# Patient Record
Sex: FEMALE | Race: BLACK OR AFRICAN AMERICAN | Employment: PART TIME | URBAN - METROPOLITAN AREA
[De-identification: names, ages, dates, MRNs, and addresses within clinical notes are randomized per-mention and may not be internally consistent; named-entity substitution may affect disease eponyms.]

---
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
## 2020-12-31 NOTE — PLAN OF CARE
Verbalizes/displays adequate comfort level or patient's stated pain goal Progressing    Pt receiving scheduled pain meds, rates pain 3-5/10. Assumed care of patient at 0700. Pt sleeping most of the morning, awake now and attempting to eat.  Pt rates ches Metastatic cancer Metastatic cancer Metastatic cancer Metastatic cancer Metastatic cancer

## 2022-07-28 NOTE — PLAN OF CARE
Patient contacted and he states that Dr Fina Meehan referred him to Dr Dean Kraus. I told him that he would have to call Dr Fina Meehan and ask him to call Dean Kraus. Patient with vitals stable-afebrile. Patient states pain of \"6\" throughout shift despite toradol and dilaudid ATC. Patient up walking in hallway to [employer], watching TV and on phone. Patient tolerating regular diet- appetite 50%.  IVF infusi

## 2024-05-25 NOTE — PLAN OF CARE
PAIN - PEDIATRIC    • Verbalizes/displays adequate comfort level or patient's stated pain goal Progressing        Patient/Family Goals    • Patient/Family Long Term Goal Progressing    • Patient/Family Short Term Goal Progressing          VSS.   Pt rating p Statement Selected

## 2024-09-09 NOTE — CONSULTS
----- Message from Jessica Behrens, DO sent at 9/8/2024 10:31 AM CDT -----  Please call the patient with her vulvar biopsy results. It came back as changes compatible with a dermatitis/eczema type picture which can have a number of causes. They do not see lichen sclerosis and there is no abnormal cells. She should start putting vaseline over a clean vulva everyday and we will discuss at follow up visit how we can treat this. Thanks, Dr. Behrens    [facility]    Report of Consultation    Elias Brothers Patient Status:  Inpatient    2000 MRN [id number]   [facility] 1SE-B Attending Lita Lamas MD   [facility] Day # 4 PCP Michelle Martin MD     Date of Admission:  2018  D mother requested an adult hematology consultation for continuity of care and aid in managing crises on days that Peds Hematology is not available at [facility].            History:  Past Medical History:   Diagnosis Date   • Arrhythmia 2012    history of heart g, 17 g, Oral, BID  •  Senna-Docusate Sodium (SENOKOT S) 8.6-50 MG tab 2 tablet, 2 tablet, Oral, Daily  •  lidocaine (LIDODERM) 5 % 1 patch, 1 patch, Transdermal, Q24H  •  acetaminophen (TYLENOL) tab 650 mg, 650 mg, Oral, Q8H PRN  •  dextrose 5 % and 0.9 % 02/20/2018   ALT 51 02/20/2018       Imaging:  RUQ U/S: CONCLUSION:  Cholelithiasis and sludge in the gallbladder. The gallbladder wall is thickened measuring up to 5 mm. Trace pericholecystic fluid. Findings are concerning for cholecystitis.     TOMER tracy cholecystectomy at a tertiary center with consultation with their sickle cell team.         Thank you for allowing me to participate in the care of your patient.     Ambrocio Davison  2/20/2018  7:02 PM

## (undated) NOTE — ED AVS SNAPSHOT
Poncho Davila   MRN: [id number]    Department:  [facility] Emergency Department   Date of Visit:  9/18/2017           Disclosure     Insurance plans vary and the physician(s) referred by the ER may not be covered by your plan.  Please contact If you have been prescribed any medication(s), please fill your prescription right away and begin taking the medication(s) as directed    If the emergency physician has read X-rays, these will be re-interpreted by a radiologist.  If there is a significant

## (undated) NOTE — IP AVS SNAPSHOT
[facility] [place] [address] [place], [address] ~ [phone]                Discharge Summary   4/6/2017    Jazmin Graham           Admission Information        Provider Department    4/6/2017 Yoli Brink MD  4nw-A         Than Please  your prescriptions at the location directed by your doctor or nurse     Bring a paper prescription for each of these medications    - lidocaine 5 % Ptch            Follow-up Information     Follow up with Ravindra May MD In 1 week.     Spe 2.11 (L) (04/11/17)  6.8 (LL) (04/11/17)  19.3 (L) (04/11/17)  91.5 -- -- -- (04/11/17)  353.0 --    (04/10/17)  12.1 (04/10/17)  2.03 (L) (04/10/17)  6.8 (LL) (04/10/17)  18.4 (L) (04/10/17)  90.6    (04/10/17)  308.0     (04/09/17)  11.2 (04/09/17)  2.21 harming yourself, contact [facility] at [phone]. - If you don’t have insurance, José Greenberg has partnered with Patient [address] to help you get signed up for insurance coverage.   Patient [place] your medications while at home.          Narcotic Medications     HYDROcodone-acetaminophen 5-325 MG Oral Tab       Use:  Treat pain   Most common side effects: Constipation, drowsiness, dizziness, urinary retention (inability to urinate)   What to report t

## (undated) NOTE — IP AVS SNAPSHOT
Patient Demographics     Address  [address] [place] [address] B  [place] [address] Phone  [phone] [facility])  [phone] (Mobile) *Preferred* E-mail Address  [email or web address]      Emergency Contact(s)     Name Relation Home Work Mobile    Roderick Cowart [id number] PEG 3350 (MIRALAX) powder packet 17 g 04/15/18 0808 Given      [id number] Pantoprazole Sodium (PROTONIX) EC tab 40 mg 04/15/18 0809 Given      [id number] dextrose 5 % and 0.9 % NaCl infusion 04/14/18 2004 New Bag      [id number] dextrose 5 % and 0. for management of another pain crisis. The last time patient was admitted to [facility] was 3/21-3/28 for pain crisis. During that admission she required blood transfusion. Pain was managed with Dilaudid and Toradol.  At time of discharge patient's ward then admitted to pediatric floor. [GA.2]    REVIEW OF SYSTEMS:  Remaining review of systems as above, otherwise negative.       PAST MEDICAL HISTORY:  Past Medical History:   Diagnosis Date   • Acute chest pain 2/16/2018   • Anemia, unspecified type 2/16/201 daily. Disp: 30 tablet Rfl: 0 3/20/2018 at Unknown time   hydroxyurea 500 MG Oral Cap Take 2,500 mg by mouth nightly. Disp:  Rfl:  3/20/2018 at Unknown time[GA. 3]       ALLERGIES:  No Known Allergies    IMMUNIZATIONS:    Immunization History  Administere Allow regular diet. IV fluids at maintenance. For pain control put patient on Dilaudid 1 mg every 4 hours, Tylenol 650 mg every 6 hours. Do not use Toradol and Motrin due to frequent use lately. Colace, Miralax for bowel regimen.   Benadryl as needed for

## (undated) NOTE — LETTER
[address] [facility] FOR BLOOD OR BLOOD COMPONENTS      In the course of your treatment, it may become necessary to administer a transfusion of blood or blood components.  This form provides basic information concerning this proc explain the alternatives to you if it has not already been done. I,Saritha Cowart, have read/had read to me the above. I understand the matters bearing on the decision whether or not to authorize a transfusion of blood or blood components.  I have no q

## (undated) NOTE — LETTER
[address] [facility] FOR BLOOD OR BLOOD COMPONENTS      In the course of your treatment, it may become necessary to administer a transfusion of blood or blood components.  This form provides basic information concerning this proc explain the alternatives to you if it has not already been done. I,Saritha Cowart, have read/had read to me the above. I understand the matters bearing on the decision whether or not to authorize a transfusion of blood or blood components.  I have no q

## (undated) NOTE — LETTER
10/15/17    Saritha Cowart      To Whom It May Concern: This letter has been written at the patient's request. The above patient was seen at [facility] for treatment of a medical condition from October 13-October 15, 2017.     The patient may re

## (undated) NOTE — ED AVS SNAPSHOT
Elvira Abbasi   MRN: [id number]    Department:  [facility] Emergency Department   Date of Visit:  4/5/2018           Disclosure     Insurance plans vary and the physician(s) referred by the ER may not be covered by your plan.  Please contact y tell this physician (or your personal doctor if your instructions are to return to your personal doctor) about any new or lasting problems. The primary care or specialist physician will see patients referred from the [facility] Emergency Department.  Margi Lisa

## (undated) NOTE — IP AVS SNAPSHOT
[facility] [place] [address] [place], [address] ~ [phone]                Discharge Summary   6/5/2017    Griselda Pool           Admission Information        Provider Department    6/5/2017 Daxa Pitts MD  1se-B         Than JADENU 90 MG Tabs   Last time this was given:  1 tablet on 6/5/2017  9:26 PM   Generic drug:  Deferasirox        Take 1 tablet by mouth daily.       [    ]    [    ]    [    ]    [    ]         STOP taking these medications     HYDROcodone-acetaminophen 10 420.0 --    (06/05/17)  25.4 (H) (06/05/17)  1.48 (L) (06/05/17)  5.0 (LL) (06/05/17)  14.3 (L) (06/05/17)  96.6    (06/05/17)  596.0 (H)     (04/11/17)  11.8 (04/11/17)  2.11 (L) (04/11/17)  6.8 (LL) (04/11/17)  19.3 (L) (04/11/17)  91.5    (04/11/17)  35 harming yourself, contact [facility] at [phone]. - If you don’t have insurance, José Greenberg has partnered with Patient [address] to help you get signed up for insurance coverage.   Patient [place] your medications while at home.          GI Medications     docusate sodium 100 MG Oral Cap    PEG 3350 Oral Powd Pack       Use:  Nausea/vomiting, acid reflux, low bowel motility, stomach pain   Most common side effects:  Depends on the specific medication

## (undated) NOTE — LETTER
4/11/2017          To Whom It May Concern:      Please excuse Magaly Hathaway from   4/6/2017- 4/11/2017  She may return to school on 4/12/2017. Activity is restricted as follows: none. If you require additional information please contact our office.   [phone]

## (undated) NOTE — LETTER
[address] [facility] FOR BLOOD OR BLOOD COMPONENTS      In the course of your treatment, it may become necessary to administer a transfusion of blood or blood components.  This form provides basic information concerning this proc explain the alternatives to you if it has not already been done. I,Saritha Cowart, have read/had read to me the above. I understand the matters bearing on the decision whether or not to authorize a transfusion of blood or blood components.  I have no q

## (undated) NOTE — LETTER
Date: 9/14/2017    Patient Name: Ayse Porras          To Whom it may concern:     This letter has been written at the patient's request. The above patient was seen and hospitalized  at the [facility] for treatment of a medical condition

## (undated) NOTE — LETTER
[address] [facility] FOR BLOOD OR BLOOD COMPONENTS      In the course of your treatment, it may become necessary to administer a transfusion of blood or blood components.  This form provides basic information concerning this proc explain the alternatives to you if it has not already been done. I,Saritha Cowart, have read/had read to me the above. I understand the matters bearing on the decision whether or not to authorize a transfusion of blood or blood components.  I have no q

## (undated) NOTE — IP AVS SNAPSHOT
[address]/[facility]            (For Outpatient Use Only) Initial Admit Date: 4/9/2018   Inpt/Obs Admit Date: Inpt: 4/9/18 / Obs: N/A   Discharge Date:    Weston Martinez:  [de-identified]   MRN: [de-identified]   CSN: [id number]        ENCOUNTER  Patient C Subscriber ID:  Pt Rel to Subscriber:    Hospital Account Financial Class: Medicaid    April 15, 2018

## (undated) NOTE — IP AVS SNAPSHOT
[facility] [place] [address] [place], [address] ~ [phone]                Discharge Summary   1/5/2017    Lexii Durand           Admission Information        Provider Department    1/5/2017 Milly Cheung MD  1se-B         T Take 4 capsules (2,000 mg total) by mouth daily.     Melanie Standing     [    ]    [    ]    [    ]    [    ]       ibuprofen 600 MG Tabs   Last time this was given:  600 mg on 1/9/2017 10:55 AM   Commonly known as:  MOTRIN        Take 1 tablet (600 mg tota [address]  [phone]          Follow up with Ila Gomez MD.    Specialties:  Pediatric Cardiology, CARDIOLOGY    Why:  for evaluate of pulmonary hypertension and near-fainting spells    Contact informati 36.6  (12/29/16)  378.0       Recent Hematology Lab Results (cont.)  (Last 3 results in the past 90 days)    Neutrophil % Lymphocyte % Monocyte % Eosinophil % Basophil % Prelim Neut Abs Final Neut Abs Lymphocyte Abso Monocyte Absolu Eosinophil Abso Basophi can help with your Affordable Care Act coverage, as well as all types of Medicaid plans. To get signed up and covered, please call [phone] and ask to get set up for an insurance coverage that is in-network with José Sylvester

## (undated) NOTE — LETTER
3949 [facility] FOR BLOOD OR BLOOD COMPONENTS      In the course of your treatment, it may become necessary to administer a transfusion of blood or blood components.  This form provides basic information concerning this proc explain the alternatives to you if it has not already been done. I,Saritha Cowart, have read/had read to me the above. I understand the matters bearing on the decision whether or not to authorize a transfusion of blood or blood components.  I have no q

## (undated) NOTE — LETTER
Date: 9/14/2017    Patient Name: Lan Arroyo          To Whom It May Concern: This letter has been written at the patient's request. The above patient was seen and hospitalzed at the [facility] for treatment of a medical condition.

## (undated) NOTE — ED AVS SNAPSHOT
Flakita Parrish   MRN: [id number]    Department:  [facility] Emergency Department   Date of Visit:  9/5/2017           Disclosure     Insurance plans vary and the physician(s) referred by the ER may not be covered by your plan.  Please contact y If you have been prescribed any medication(s), please fill your prescription right away and begin taking the medication(s) as directed    If the emergency physician has read X-rays, these will be re-interpreted by a radiologist.  If there is a significant

## (undated) NOTE — LETTER
01/23/18    Saritha Cowart      To Whom It May Concern: This letter has been written at the patient's request. The above patient was seen at [facility] for treatment of a medical condition from 1/20/2018 thru 1/23/2018.     The patient may retur

## (undated) NOTE — IP AVS SNAPSHOT
[facility] [place] [address] [place], [address] ~ [phone]                Discharge Summary   2/27/2017    Elba Salazar           Admission Information        Provider Department    2/27/2017 Destinee Canseco MD  1se-B         Than Take 2,000 mg by mouth daily.       [    ]    [    ]    [    ]    [    ]            Where to Get Your Medications      Please  your prescriptions at the location directed by your doctor or nurse     Bring a paper prescription for each of these m 19.5 (L) (02/27/17)  92.9 -- -- -- (02/27/17)  492.0 (H) --    (01/05/17)  8.7 (01/05/17)  2.43 (L) (01/05/17)  7.9 (L) (01/05/17)  22.8 (L) (01/05/17)  93.8    (01/05/17)  528.0 (H)     (12/30/16)  12.9 (12/30/16)  2.01 (L) (12/30/16)  6.3 (LL) (12/30/16) you to explore options for quitting.     - If you have concerns related to behavioral health issues or thoughts of harming yourself, contact [facility] at [phone].     - If you don’t have insurance, José Greenberg experience these side effects or respond to medications the same. Please call your provider or healthcare team if you have any questions regarding your medications while at home.          Narcotic Medications     HYDROcodone-acetaminophen 5-325 MG Oral Tab

## (undated) NOTE — LETTER
Date: 11/16/2017    Patient Name: Gwyn Leonard          To Whom it may concern: The above patient was seen at the [facility] and hospitalized for treatment of a medical condition.     This patient should be excused from attending work

## (undated) NOTE — LETTER
12/23/17    Saritha Cowart      To Whom It May Concern: This letter has been written at the patient's request. The above patient was seen at [facility] for treatment of a medical condition from 12/22/2017-12/23/2017.     The patient may return t